# Patient Record
Sex: MALE | NOT HISPANIC OR LATINO | URBAN - METROPOLITAN AREA
[De-identification: names, ages, dates, MRNs, and addresses within clinical notes are randomized per-mention and may not be internally consistent; named-entity substitution may affect disease eponyms.]

---

## 2021-01-28 ENCOUNTER — IMMUNIZATION (OUTPATIENT)
Dept: VACCINE CLINIC | Facility: HOSPITAL | Age: 66
End: 2021-01-28

## 2021-01-28 PROCEDURE — 91300 HC SARSCOV02 VAC 30MCG/0.3ML IM: CPT | Performed by: INTERNAL MEDICINE

## 2021-01-28 PROCEDURE — 0001A: CPT | Performed by: INTERNAL MEDICINE

## 2021-02-18 ENCOUNTER — IMMUNIZATION (OUTPATIENT)
Dept: VACCINE CLINIC | Facility: HOSPITAL | Age: 66
End: 2021-02-18

## 2021-02-18 PROCEDURE — 0002A: CPT | Performed by: INTERNAL MEDICINE

## 2021-02-18 PROCEDURE — 91300 HC SARSCOV02 VAC 30MCG/0.3ML IM: CPT | Performed by: INTERNAL MEDICINE

## 2023-12-11 ENCOUNTER — HOSPITAL ENCOUNTER (OUTPATIENT)
Dept: GENERAL RADIOLOGY | Facility: HOSPITAL | Age: 68
Discharge: HOME | End: 2023-12-11
Admitting: CLINIC/CENTER
Payer: MEDICARE

## 2023-12-11 ENCOUNTER — TRANSCRIBE ORDERS (OUTPATIENT)
Dept: GENERAL RADIOLOGY | Facility: HOSPITAL | Age: 68
End: 2023-12-11
Payer: MEDICARE

## 2023-12-11 DIAGNOSIS — M25.552 LEFT HIP PAIN: ICD-10-CM

## 2023-12-11 DIAGNOSIS — M25.552 LEFT HIP PAIN: Primary | ICD-10-CM

## 2023-12-11 PROCEDURE — 73502 X-RAY EXAM HIP UNI 2-3 VIEWS: CPT

## 2025-02-20 RX ORDER — AMLODIPINE BESYLATE 10 MG/1
TABLET ORAL
Qty: 90 TABLET | Refills: 3 | Status: SHIPPED | OUTPATIENT
Start: 2025-02-20

## 2025-02-27 ENCOUNTER — TELEPHONE (OUTPATIENT)
Age: 70
End: 2025-02-27
Payer: MEDICARE

## 2025-02-27 DIAGNOSIS — E78.2 MIXED HYPERLIPIDEMIA: ICD-10-CM

## 2025-02-27 DIAGNOSIS — I10 ESSENTIAL HYPERTENSION: Primary | ICD-10-CM

## 2025-02-28 ENCOUNTER — LAB (OUTPATIENT)
Facility: HOSPITAL | Age: 70
End: 2025-02-28
Payer: MEDICARE

## 2025-02-28 DIAGNOSIS — I10 ESSENTIAL HYPERTENSION: ICD-10-CM

## 2025-02-28 DIAGNOSIS — E78.2 MIXED HYPERLIPIDEMIA: ICD-10-CM

## 2025-02-28 LAB
ALBUMIN SERPL-MCNC: 4 G/DL (ref 3.5–5.2)
ALBUMIN/GLOB SERPL: 1.4 G/DL
ALP SERPL-CCNC: 90 U/L (ref 39–117)
ALT SERPL W P-5'-P-CCNC: 20 U/L (ref 1–41)
ANION GAP SERPL CALCULATED.3IONS-SCNC: 12 MMOL/L (ref 5–15)
AST SERPL-CCNC: 19 U/L (ref 1–40)
BILIRUB SERPL-MCNC: 0.2 MG/DL (ref 0–1.2)
BUN SERPL-MCNC: 11 MG/DL (ref 8–23)
BUN/CREAT SERPL: 12 (ref 7–25)
CALCIUM SPEC-SCNC: 8.9 MG/DL (ref 8.6–10.5)
CHLORIDE SERPL-SCNC: 105 MMOL/L (ref 98–107)
CHOLEST SERPL-MCNC: 111 MG/DL (ref 0–200)
CO2 SERPL-SCNC: 25 MMOL/L (ref 22–29)
CREAT SERPL-MCNC: 0.92 MG/DL (ref 0.76–1.27)
EGFRCR SERPLBLD CKD-EPI 2021: 90 ML/MIN/1.73
GLOBULIN UR ELPH-MCNC: 2.8 GM/DL
GLUCOSE SERPL-MCNC: 121 MG/DL (ref 65–99)
HDLC SERPL-MCNC: 34 MG/DL (ref 40–60)
LDLC SERPL CALC-MCNC: 45 MG/DL (ref 0–100)
LDLC/HDLC SERPL: 1.09 {RATIO}
POTASSIUM SERPL-SCNC: 3.5 MMOL/L (ref 3.5–5.2)
PROT SERPL-MCNC: 6.8 G/DL (ref 6–8.5)
SODIUM SERPL-SCNC: 142 MMOL/L (ref 136–145)
TRIGL SERPL-MCNC: 200 MG/DL (ref 0–150)
VLDLC SERPL-MCNC: 32 MG/DL (ref 5–40)

## 2025-02-28 PROCEDURE — 36415 COLL VENOUS BLD VENIPUNCTURE: CPT

## 2025-02-28 PROCEDURE — 80053 COMPREHEN METABOLIC PANEL: CPT

## 2025-02-28 PROCEDURE — 80061 LIPID PANEL: CPT

## 2025-03-05 PROBLEM — I10 HYPERTENSION, ESSENTIAL: Status: ACTIVE | Noted: 2025-03-05

## 2025-03-05 PROBLEM — I25.10 CORONARY ARTERY DISEASE INVOLVING NATIVE CORONARY ARTERY OF NATIVE HEART: Status: ACTIVE | Noted: 2025-03-05

## 2025-03-05 PROBLEM — E78.5 HYPERLIPIDEMIA LDL GOAL <55: Status: ACTIVE | Noted: 2025-03-05

## 2025-03-05 NOTE — PROGRESS NOTES
"    Cardiology Follow-Up Note     Name: Keenan Posey  :   1955  PCP: Jeff Wheat MD  Date:   2025  Department: [facility] CARDIOLOGY  [address]  [facility] [address]  Fax [phone]  Phone [phone]    Chief Complaint   Patient presents with    Coronary Artery Disease    Hypertension    Hyperlipidemia     Subjective     History of Present Illness  Keenan Posey is a 70 y.o. male who presents today for 6-month follow-up.  Patient with past medical history of STEMI, CAD s/p stenting, hypertension, hyperlipidemia.      CT chest 2024: Mild ectasia of aorta measuring 35 mm  Cath 11: Stent to CX, patent stent RCA  Cath 2011: STEMI, stent RCA  Echo 2023: EF 69%, mild LVH, trace MR, trace TR, aortic root 4.3 cm      Current Outpatient Medications:     amLODIPine (NORVASC) 10 MG tablet, TAKE 1 TABLET ONE TIME DAILY, Disp: 90 tablet, Rfl: 3    aspirin 81 MG EC tablet, Take 1 tablet by mouth Daily., Disp: , Rfl:     atorvastatin (LIPITOR) 20 MG tablet, Take 1 tablet by mouth Daily., Disp: , Rfl:     carvedilol (COREG) 25 MG tablet, Take 1 tablet by mouth 2 (Two) Times a Day With Meals., Disp: , Rfl:     colchicine (Colcrys) 0.6 MG tablet, Take 1 tablet by mouth Daily., Disp: , Rfl:     lisinopril (PRINIVIL,ZESTRIL) 40 MG tablet, Take 1 tablet by mouth Daily., Disp: , Rfl:     Objective     Vital Signs:  /65 (BP Location: Right arm, Patient Position: Sitting)   Pulse 79   Ht 175.3 cm (69\")   Wt 100 kg (221 lb)   BMI 32.64 kg/m²   Estimated body mass index is 32.64 kg/m² as calculated from the following:    Height as of this encounter: 175.3 cm (69\").    Weight as of this encounter: 100 kg (221 lb).             Vitals reviewed.   Constitutional:       Appearance: Normal and healthy appearance.   Eyes:      Pupils: Pupils are equal, round, and reactive to light.   Pulmonary:      Effort: Pulmonary effort is normal. " "  Chest:      Chest wall: Not tender to palpatation.   Cardiovascular:      PMI at left midclavicular line. Normal rate. Regular rhythm.      No gallop.    Pulses:     Intact distal pulses.   Edema:     Peripheral edema absent.   Skin:     General: Skin is warm.   Psychiatric:         Behavior: Behavior is cooperative.              Data Review:   Lab Results   Component Value Date    GLUCOSE 121 (H) 02/28/2025    BUN 11 02/28/2025    CREATININE 0.92 02/28/2025    BCR 12.0 02/28/2025    K 3.5 02/28/2025    CO2 25.0 02/28/2025    CALCIUM 8.9 02/28/2025    ALBUMIN 4.0 02/28/2025    AST 19 02/28/2025    ALT 20 02/28/2025     Lab Results   Component Value Date    CHOL 111 02/28/2025    TRIG 200 (H) 02/28/2025    HDL 34 (L) 02/28/2025    LDL 45 02/28/2025      No results found for: \"WBC\", \"RBC\", \"HGB\", \"HCT\", \"MCV\", \"PLT\"  No results found for: \"TSH\"  No results found for: \"HGBA1C\"  No results found for: \"INR\", \"PROTIME\"        Assessment and Plan     Assessment & Plan  Coronary artery disease involving native coronary artery of native heart without angina pectoris  Continue aspirin 81 mg once a day, notify if develop angina.    Orders:    Lipid Panel; Future    CBC & Differential; Future    Basic Metabolic Panel; Future    Hypertension, essential  Continue lisinopril 40 mg once a day, Coreg 25 mg twice a day and amlodipine 10 mg once a day.  Keep blood pressure ideally less than 120/80 on the average less than 130/80.    Orders:    Lipid Panel; Future    CBC & Differential; Future    Basic Metabolic Panel; Future    Mixed hyperlipidemia  Continue Lipitor 20 mg once a day.  Goal is to keep LDL less than 55.  Will start Vascepa 2 g p.o. twice daily.  Notify if develop any arrhythmias, discussed with patient side effects.    Orders:    Lipid Panel; Future    CBC & Differential; Future    Basic Metabolic Panel; Future      Advised to continue current cardiac medications. Please notify of any issues. Discussed with the " patient compliance with medical management and follow-up.     Follow Up  Return in about 3 months (around 6/6/2025).    Call if you have any significant symptoms or go to the [facility] if possible.     Stevenson Griffith MD, FACC,[facility].  [facility]    Part of this note may be an electronic transcription/translation of spoken language to printed text using the Dragon Dictation System.

## 2025-03-06 ENCOUNTER — OFFICE VISIT (OUTPATIENT)
Age: 70
End: 2025-03-06
Payer: MEDICARE

## 2025-03-06 VITALS
WEIGHT: 221 LBS | SYSTOLIC BLOOD PRESSURE: 125 MMHG | DIASTOLIC BLOOD PRESSURE: 65 MMHG | BODY MASS INDEX: 32.73 KG/M2 | HEIGHT: 69 IN | HEART RATE: 79 BPM

## 2025-03-06 DIAGNOSIS — I25.10 CORONARY ARTERY DISEASE INVOLVING NATIVE CORONARY ARTERY OF NATIVE HEART WITHOUT ANGINA PECTORIS: Primary | ICD-10-CM

## 2025-03-06 DIAGNOSIS — E78.2 MIXED HYPERLIPIDEMIA: ICD-10-CM

## 2025-03-06 DIAGNOSIS — I10 HYPERTENSION, ESSENTIAL: ICD-10-CM

## 2025-03-06 RX ORDER — COLCHICINE 0.6 MG/1
0.6 TABLET ORAL DAILY
COMMUNITY

## 2025-03-06 RX ORDER — ASPIRIN 81 MG/1
1 TABLET ORAL DAILY
COMMUNITY

## 2025-03-06 RX ORDER — LISINOPRIL 40 MG/1
40 TABLET ORAL DAILY
COMMUNITY
Start: 2025-02-03

## 2025-03-06 RX ORDER — ICOSAPENT ETHYL 1 G/1
2 CAPSULE ORAL 2 TIMES DAILY WITH MEALS
Qty: 120 CAPSULE | Refills: 3 | Status: SHIPPED | OUTPATIENT
Start: 2025-03-06

## 2025-03-06 RX ORDER — ATORVASTATIN CALCIUM 20 MG/1
20 TABLET, FILM COATED ORAL DAILY
COMMUNITY

## 2025-03-06 RX ORDER — CARVEDILOL 25 MG/1
25 TABLET ORAL 2 TIMES DAILY WITH MEALS
COMMUNITY
Start: 2024-12-29

## 2025-03-06 NOTE — ASSESSMENT & PLAN NOTE
Continue Lipitor 20 mg once a day.  Goal is to keep LDL less than 55.  Will start Vascepa 2 g p.o. twice daily.  Notify if develop any arrhythmias, discussed with patient side effects.    Orders:    Lipid Panel; Future    CBC & Differential; Future    Basic Metabolic Panel; Future

## 2025-03-06 NOTE — ASSESSMENT & PLAN NOTE
Continue aspirin 81 mg once a day, notify if develop angina.    Orders:    Lipid Panel; Future    CBC & Differential; Future    Basic Metabolic Panel; Future

## 2025-03-06 NOTE — ASSESSMENT & PLAN NOTE
Continue lisinopril 40 mg once a day, Coreg 25 mg twice a day and amlodipine 10 mg once a day.  Keep blood pressure ideally less than 120/80 on the average less than 130/80.    Orders:    Lipid Panel; Future    CBC & Differential; Future    Basic Metabolic Panel; Future

## 2025-03-12 RX ORDER — CARVEDILOL 25 MG/1
25 TABLET ORAL 2 TIMES DAILY
Qty: 180 TABLET | Refills: 1 | Status: SHIPPED | OUTPATIENT
Start: 2025-03-12

## 2025-03-12 NOTE — TELEPHONE ENCOUNTER
Rx Refill Note  Requested Prescriptions     Pending Prescriptions Disp Refills    carvedilol (COREG) 25 MG tablet [Pharmacy Med Name: Carvedilol Oral Tablet 25 MG] 180 tablet 3     Sig: TAKE 1 TABLET TWICE DAILY      Last office visit with prescribing clinician: 3/6/2025   Last telemedicine visit with prescribing clinician: Visit date not found   Next office visit with prescribing clinician: 6/6/2025                        Would you like a call back once the refill request has been completed: [] Yes [x] No [] N/A    If the office needs to give you a call back, can they leave a voicemail: [] Yes [x] No [] N/A    Pharmacy Info    Last Fill Date: NA  Rx Written Date: NA  Prescribed Qty: 180  Additional Details from Pharmacy: JULIANN Carlson MA  03/12/25, 07:59 EDT

## 2025-04-17 RX ORDER — LISINOPRIL 40 MG/1
TABLET ORAL
Qty: 90 TABLET | Refills: 3 | Status: SHIPPED | OUTPATIENT
Start: 2025-04-17

## 2025-04-17 RX ORDER — ATORVASTATIN CALCIUM 20 MG/1
20 TABLET, FILM COATED ORAL DAILY
Qty: 90 TABLET | Refills: 3 | Status: SHIPPED | OUTPATIENT
Start: 2025-04-17

## 2025-04-17 NOTE — TELEPHONE ENCOUNTER
Rx Refill Note  Requested Prescriptions     Pending Prescriptions Disp Refills    atorvastatin (LIPITOR) 20 MG tablet [Pharmacy Med Name: Atorvastatin Calcium Oral Tablet 20 MG] 90 tablet 3     Sig: TAKE 1 TABLET EVERY DAY    lisinopril (PRINIVIL,ZESTRIL) 40 MG tablet [Pharmacy Med Name: Lisinopril Oral Tablet 40 MG] 90 tablet 3     Sig: TAKE 1 TABLET ONE TIME DAILY      Last office visit with prescribing clinician: 3/6/2025   Last telemedicine visit with prescribing clinician: Visit date not found   Next office visit with prescribing clinician: 6/6/2025                        Pharmacy Info    Last Fill Date:  Rx Written Date:   Prescribed Qty:   Additional Details from Pharmacy:    Patient passed protocols per yong.         Paige Carlson MA  04/17/25, 08:44 EDT

## 2025-05-29 ENCOUNTER — LAB (OUTPATIENT)
Facility: HOSPITAL | Age: 70
End: 2025-05-29
Payer: MEDICARE

## 2025-05-29 DIAGNOSIS — I25.10 CORONARY ARTERY DISEASE INVOLVING NATIVE CORONARY ARTERY OF NATIVE HEART WITHOUT ANGINA PECTORIS: ICD-10-CM

## 2025-05-29 DIAGNOSIS — I10 ESSENTIAL HYPERTENSION: ICD-10-CM

## 2025-05-29 DIAGNOSIS — E78.2 MIXED HYPERLIPIDEMIA: ICD-10-CM

## 2025-05-29 DIAGNOSIS — I10 HYPERTENSION, ESSENTIAL: ICD-10-CM

## 2025-05-29 LAB
ANION GAP SERPL CALCULATED.3IONS-SCNC: 11.1 MMOL/L (ref 5–15)
BASOPHILS # BLD AUTO: 0.07 10*3/MM3 (ref 0–0.2)
BASOPHILS NFR BLD AUTO: 0.6 % (ref 0–1.5)
BUN SERPL-MCNC: 10 MG/DL (ref 8–23)
BUN/CREAT SERPL: 12.2 (ref 7–25)
CALCIUM SPEC-SCNC: 9.3 MG/DL (ref 8.6–10.5)
CHLORIDE SERPL-SCNC: 103 MMOL/L (ref 98–107)
CHOLEST SERPL-MCNC: 98 MG/DL (ref 0–200)
CO2 SERPL-SCNC: 24.9 MMOL/L (ref 22–29)
CREAT SERPL-MCNC: 0.82 MG/DL (ref 0.76–1.27)
DEPRECATED RDW RBC AUTO: 48.1 FL (ref 37–54)
EGFRCR SERPLBLD CKD-EPI 2021: 95.1 ML/MIN/1.73
EOSINOPHIL # BLD AUTO: 0.31 10*3/MM3 (ref 0–0.4)
EOSINOPHIL NFR BLD AUTO: 2.8 % (ref 0.3–6.2)
ERYTHROCYTE [DISTWIDTH] IN BLOOD BY AUTOMATED COUNT: 15.2 % (ref 12.3–15.4)
GLUCOSE SERPL-MCNC: 131 MG/DL (ref 65–99)
HCT VFR BLD AUTO: 39.2 % (ref 37.5–51)
HDLC SERPL-MCNC: 30 MG/DL (ref 40–60)
HGB BLD-MCNC: 12.1 G/DL (ref 13–17.7)
IMM GRANULOCYTES # BLD AUTO: 0.04 10*3/MM3 (ref 0–0.05)
IMM GRANULOCYTES NFR BLD AUTO: 0.4 % (ref 0–0.5)
LDLC SERPL CALC-MCNC: 44 MG/DL (ref 0–100)
LDLC/HDLC SERPL: 1.34 {RATIO}
LYMPHOCYTES # BLD AUTO: 2.45 10*3/MM3 (ref 0.7–3.1)
LYMPHOCYTES NFR BLD AUTO: 22.3 % (ref 19.6–45.3)
MCH RBC QN AUTO: 26.7 PG (ref 26.6–33)
MCHC RBC AUTO-ENTMCNC: 30.9 G/DL (ref 31.5–35.7)
MCV RBC AUTO: 86.3 FL (ref 79–97)
MONOCYTES # BLD AUTO: 1.04 10*3/MM3 (ref 0.1–0.9)
MONOCYTES NFR BLD AUTO: 9.5 % (ref 5–12)
NEUTROPHILS NFR BLD AUTO: 64.4 % (ref 42.7–76)
NEUTROPHILS NFR BLD AUTO: 7.09 10*3/MM3 (ref 1.7–7)
NRBC BLD AUTO-RTO: 0 /100 WBC (ref 0–0.2)
PLATELET # BLD AUTO: 345 10*3/MM3 (ref 140–450)
PMV BLD AUTO: 9.8 FL (ref 6–12)
POTASSIUM SERPL-SCNC: 3.8 MMOL/L (ref 3.5–5.2)
RBC # BLD AUTO: 4.54 10*6/MM3 (ref 4.14–5.8)
SODIUM SERPL-SCNC: 139 MMOL/L (ref 136–145)
TRIGL SERPL-MCNC: 139 MG/DL (ref 0–150)
VLDLC SERPL-MCNC: 24 MG/DL (ref 5–40)
WBC NRBC COR # BLD AUTO: 11 10*3/MM3 (ref 3.4–10.8)

## 2025-05-29 PROCEDURE — 80061 LIPID PANEL: CPT

## 2025-05-29 PROCEDURE — 36415 COLL VENOUS BLD VENIPUNCTURE: CPT

## 2025-05-29 PROCEDURE — 80048 BASIC METABOLIC PNL TOTAL CA: CPT

## 2025-05-29 PROCEDURE — 85025 COMPLETE CBC W/AUTO DIFF WBC: CPT

## 2025-06-04 PROBLEM — I77.810 DILATED AORTIC ROOT: Status: ACTIVE | Noted: 2025-06-04

## 2025-06-04 PROBLEM — E78.5 HYPERLIPIDEMIA: Status: ACTIVE | Noted: 2025-06-04

## 2025-06-04 PROBLEM — I10 PRIMARY HYPERTENSION: Status: ACTIVE | Noted: 2025-06-04

## 2025-06-04 NOTE — ASSESSMENT & PLAN NOTE
Lipid abnormalities are stable    Plan:  Continue same medication/s without change.      Discussed medication dosage, use, side effects, and goals of treatment in detail.    Counseled patient on lifestyle modifications to help control hyperlipidemia.   Advised patient to exercise for 150 minutes weekly. (30 minute brisk walk, 5 days a week for example)    Patient Treatment Goals:   LDL goal is less than 55    Followup in 3 months.  Continue Lipitor 20 mg once a day and Vascepa 2 g twice a day.  To lower mixed hyperlipidemia both the LDL and triglycerides.

## 2025-06-04 NOTE — ASSESSMENT & PLAN NOTE
Hypertension is stable and controlled  Continue current treatment regimen.  Dietary sodium restriction.  Weight loss.  Ambulatory blood pressure monitoring.  Blood pressure will be reassessed in 3 months.  Continue lisinopril 40 mg once a day and amlodipine 10 mg once a day.  Goals discussed.    Discussed with patient American College of cardiology and American Heart Association provide detailed guidelines for accurate blood pressure measurement.  Key steps for proper blood pressure measurement include:    Recommend being fully relaxed sitting in chair with feet on the floor and back supported for at least 5 minutes.  Avoid caffeine, exercise and smoking for at least 30 minutes before management.  Ensure empty bladder, remove clothing covering the location of the cuff placement using proper blood pressure equipment and the blood pressure cuff size should be corrected with bladder encircling 80% of the arm.  Repeat blood pressure if blood pressure is extremely high.  The ideal blood pressure is less than 120/80 on the average blood pressure should be less than 130/80.

## 2025-06-04 NOTE — ASSESSMENT & PLAN NOTE
Coronary Artery Disease (OPTIONAL): Coronary artery disease is stable.  Continue current treatment regimen. Dietary sodium restriction. Weight loss.  Cardiac status will be reassessed in 3 months.  Continue aspirin 81 mg once a day to lower the risk of myocardial infarction.

## 2025-06-04 NOTE — PROGRESS NOTES
Cardiology Follow-Up Note     Name: Keenan Posey  :   1955  PCP: Jeff Wheat MD  Date:   2025  Department: [facility] CARDIOLOGY  [address]  [facility] [address]  Fax [phone]  Phone [phone]    Chief Complaint   Patient presents with    Hyperlipidemia    Hypertension     Problem list:  1.  Coronary artery disease native vessel   [facility] 2011 acute STEMI, stent to RCA   [facility] 2011 stent to circumflex, patent stent to RCA  2.  Hypertension benign essential   2D echo 2023 EF 69%, trace MR, TR, HI.  Aortic root dilated at 4.3 cm, mild increase in LV wall thickness  3.  Hyperlipidemia  4.  Aortic root dilation      Subjective     History of Present Illness  Keenan Posey is a 70 y.o. male who presents today for routine 3-month follow-up.Doing well,limited due to bad back, no chest pain or shortness of breath at rest gets dyspnea on exertion.     Past Medical History:   Diagnosis Date    Aortic root dilation     Arthritis     Coronary atherosclerosis of native coronary vessel     Gout     Hypercholesterolemia     Hypertension, benign essential, age 0-18     Mitral regurgitation     Myocardial infarction     Obesity     Tricuspid regurgitation       Past Surgical History:   Procedure Laterality Date    ANGIOPLASTY      APPENDECTOMY  1985    CARDIAC CATHETERIZATION  2011    BY    LHC/STENT TO CX/PATENT STENT TO RCA    CARDIAC CATHETERIZATION  2011    SJE  EF 50%. Acute ST elevation inferior wall myocardial infarction.  Successful Xience stenting of the RCA.    CORONARY STENT PLACEMENT         Current Outpatient Medications:     amLODIPine (NORVASC) 10 MG tablet, TAKE 1 TABLET ONE TIME DAILY, Disp: 90 tablet, Rfl: 3    aspirin 81 MG EC tablet, Take 1 tablet by mouth Daily., Disp: , Rfl:     atorvastatin (LIPITOR) 20 MG tablet, TAKE 1 TABLET EVERY DAY, Disp: 90 tablet, Rfl: 3    carvedilol (COREG) 25 MG  "tablet, TAKE 1 TABLET TWICE DAILY, Disp: 180 tablet, Rfl: 1    lisinopril (PRINIVIL,ZESTRIL) 40 MG tablet, TAKE 1 TABLET ONE TIME DAILY, Disp: 90 tablet, Rfl: 3    Objective     Vital Signs:  /68 (BP Location: Right arm, Patient Position: Sitting, Cuff Size: Adult)   Pulse 71   Ht 175.3 cm (69\")   Wt 98.9 kg (218 lb)   BMI 32.19 kg/m²   Estimated body mass index is 32.19 kg/m² as calculated from the following:    Height as of this encounter: 175.3 cm (69\").    Weight as of this encounter: 98.9 kg (218 lb).       BMI is >= 30 and <35. (Class 1 Obesity). The following options were offered after discussion;: exercise counseling/recommendations      Cardiovascular:      PMI at left midclavicular line. Normal rate. Regular rhythm. Normal S1. Normal S2.       Murmurs: There is a systolic murmur.      No gallop.  No click. No rub.   Pulses:     Intact distal pulses.   Edema:     Peripheral edema absent.              Data Review:   Lab Results   Component Value Date    GLUCOSE 131 (H) 05/29/2025    BUN 10.0 05/29/2025    CREATININE 0.82 05/29/2025    BCR 12.2 05/29/2025    K 3.8 05/29/2025    CO2 24.9 05/29/2025    CALCIUM 9.3 05/29/2025    ALBUMIN 4.0 02/28/2025    AST 19 02/28/2025    ALT 20 02/28/2025     Lab Results   Component Value Date    CHOL 98 05/29/2025    TRIG 139 05/29/2025    HDL 30 (L) 05/29/2025    LDL 44 05/29/2025      Lab Results   Component Value Date    WBC 11.00 (H) 05/29/2025    RBC 4.54 05/29/2025    HGB 12.1 (L) 05/29/2025    HCT 39.2 05/29/2025    MCV 86.3 05/29/2025     05/29/2025     No results found for: \"TSH\"  No results found for: \"HGBA1C\"  No results found for: \"INR\", \"PROTIME\"        Assessment and Plan     Assessment & Plan  Coronary artery disease involving native coronary artery of native heart, unspecified whether angina present  Coronary Artery Disease (OPTIONAL): Coronary artery disease is stable.  Continue current treatment regimen. Dietary sodium restriction. Weight " loss.  Cardiac status will be reassessed in 3 months.  Continue aspirin 81 mg once a day to lower the risk of myocardial infarction.       Primary hypertension  Hypertension is stable and controlled  Continue current treatment regimen.  Dietary sodium restriction.  Weight loss.  Ambulatory blood pressure monitoring.  Blood pressure will be reassessed in 3 months.  Continue lisinopril 40 mg once a day and amlodipine 10 mg once a day.  Goals discussed.    Discussed with patient American College of cardiology and American Heart Association provide detailed guidelines for accurate blood pressure measurement.  Key steps for proper blood pressure measurement include:    Recommend being fully relaxed sitting in chair with feet on the floor and back supported for at least 5 minutes.  Avoid caffeine, exercise and smoking for at least 30 minutes before management.  Ensure empty bladder, remove clothing covering the location of the cuff placement using proper blood pressure equipment and the blood pressure cuff size should be corrected with bladder encircling 80% of the arm.  Repeat blood pressure if blood pressure is extremely high.  The ideal blood pressure is less than 120/80 on the average blood pressure should be less than 130/80.             Mixed hyperlipidemia   Lipid abnormalities are stable    Plan:  Continue same medication/s without change.      Discussed medication dosage, use, side effects, and goals of treatment in detail.    Counseled patient on lifestyle modifications to help control hyperlipidemia.   Advised patient to exercise for 150 minutes weekly. (30 minute brisk walk, 5 days a week for example)    Patient Treatment Goals:   LDL goal is less than 55    Followup in 3 months.  Continue Lipitor 20 mg once a day and Vascepa 2 g twice a day.  To lower mixed hyperlipidemia both the LDL and triglycerides.       Dilated aortic root  Will repeat echocardiogram for evaluation of aortic root and aortic  regurgitation.         Advised to continue current cardiac medications. Please notify of any issues. Discussed with the patient compliance with medical management and follow-up.     Follow Up  No follow-ups on file.    Call if you have any significant symptoms or go to the [facility] room if possible.     Stevenson Griffith MD, FACC,[facility].  [facility]    Part of this note may be an electronic transcription/translation of spoken language to printed text using the Dragon Dictation System.

## 2025-06-06 ENCOUNTER — PATIENT ROUNDING (BHMG ONLY) (OUTPATIENT)
Age: 70
End: 2025-06-06

## 2025-06-06 ENCOUNTER — OFFICE VISIT (OUTPATIENT)
Age: 70
End: 2025-06-06
Payer: MEDICARE

## 2025-06-06 VITALS
HEART RATE: 71 BPM | HEIGHT: 69 IN | BODY MASS INDEX: 32.29 KG/M2 | WEIGHT: 218 LBS | DIASTOLIC BLOOD PRESSURE: 68 MMHG | SYSTOLIC BLOOD PRESSURE: 151 MMHG

## 2025-06-06 DIAGNOSIS — I25.10 CORONARY ARTERY DISEASE INVOLVING NATIVE CORONARY ARTERY OF NATIVE HEART, UNSPECIFIED WHETHER ANGINA PRESENT: Primary | ICD-10-CM

## 2025-06-06 DIAGNOSIS — I77.810 DILATED AORTIC ROOT: ICD-10-CM

## 2025-06-06 DIAGNOSIS — I10 PRIMARY HYPERTENSION: ICD-10-CM

## 2025-06-06 DIAGNOSIS — E78.2 MIXED HYPERLIPIDEMIA: ICD-10-CM

## 2025-06-06 NOTE — PROGRESS NOTES
My name is Lisset Otero, and I am the Practice Manager for [facility] [place].    I would like to thank you for being a loyal patient. If you do not mind, I would like to ask you some questions about your recent visit with us. Please feel free to reply if you wish to provide us with feedback on your visit with our practice.    First, could you tell me what went well with your recent visit?    Secondly, we are always looking for ways to make our patients' experiences even better. Do you have any recommendations on what we can do to improve your experience?    Finally, overall were you satisfied with your visit with us as a [facility] facility?    Over the next few days, you will be receiving a Patient Experience Survey. Please consider taking the survey, as it helps [facility] in improving their patient care.    Thank you for taking the time to answer our questions today.    I hope you have a good day.

## 2025-08-06 RX ORDER — CARVEDILOL 25 MG/1
25 TABLET ORAL 2 TIMES DAILY
Qty: 180 TABLET | Refills: 1 | Status: SHIPPED | OUTPATIENT
Start: 2025-08-06

## 2025-08-14 ENCOUNTER — HOSPITAL ENCOUNTER (OUTPATIENT)
Dept: CARDIOLOGY | Facility: HOSPITAL | Age: 70
Discharge: HOME | End: 2025-08-14
Admitting: INTERNAL MEDICINE
Payer: MEDICARE

## 2025-08-14 DIAGNOSIS — I10 PRIMARY HYPERTENSION: ICD-10-CM

## 2025-08-14 DIAGNOSIS — E78.2 MIXED HYPERLIPIDEMIA: ICD-10-CM

## 2025-08-14 DIAGNOSIS — I25.10 CORONARY ARTERY DISEASE INVOLVING NATIVE CORONARY ARTERY OF NATIVE HEART, UNSPECIFIED WHETHER ANGINA PRESENT: ICD-10-CM

## 2025-08-14 DIAGNOSIS — I77.810 DILATED AORTIC ROOT: ICD-10-CM

## 2025-08-14 LAB
BH CV ECHO LEFT VENTRICLE GLOBAL LONGITUDINAL STRAIN: -17.5 %
BH CV ECHO MEAS - AO ROOT DIAM: 4.2 CM
BH CV ECHO MEAS - EDV(CUBED): 157.5 ML
BH CV ECHO MEAS - EDV(MOD-SP2): 188 ML
BH CV ECHO MEAS - EDV(MOD-SP4): 189 ML
BH CV ECHO MEAS - EF(MOD-SP2): 44.1 %
BH CV ECHO MEAS - EF(MOD-SP4): 57.2 %
BH CV ECHO MEAS - ESV(CUBED): 50.7 ML
BH CV ECHO MEAS - ESV(MOD-SP2): 105 ML
BH CV ECHO MEAS - ESV(MOD-SP4): 80.8 ML
BH CV ECHO MEAS - FS: 31.5 %
BH CV ECHO MEAS - IVS/LVPW: 1 CM
BH CV ECHO MEAS - IVSD: 1.6 CM
BH CV ECHO MEAS - LA DIMENSION: 4 CM
BH CV ECHO MEAS - LAT PEAK E' VEL: 8.1 CM/SEC
BH CV ECHO MEAS - LV DIASTOLIC VOL/BSA (35-75): 88.3 CM2
BH CV ECHO MEAS - LV MASS(C)D: 398.8 GRAMS
BH CV ECHO MEAS - LV MAX PG: 6.4 MMHG
BH CV ECHO MEAS - LV MEAN PG: 3 MMHG
BH CV ECHO MEAS - LV SYSTOLIC VOL/BSA (12-30): 37.7 CM2
BH CV ECHO MEAS - LV V1 MAX: 126 CM/SEC
BH CV ECHO MEAS - LV V1 VTI: 27.2 CM
BH CV ECHO MEAS - LVIDD: 5.4 CM
BH CV ECHO MEAS - LVIDS: 3.7 CM
BH CV ECHO MEAS - LVOT AREA: 3.1 CM2
BH CV ECHO MEAS - LVOT DIAM: 2 CM
BH CV ECHO MEAS - LVPWD: 1.6 CM
BH CV ECHO MEAS - MED PEAK E' VEL: 7.1 CM/SEC
BH CV ECHO MEAS - MV A MAX VEL: 106 CM/SEC
BH CV ECHO MEAS - MV DEC SLOPE: 325.5 CM/SEC2
BH CV ECHO MEAS - MV DEC TIME: 0.27 SEC
BH CV ECHO MEAS - MV E MAX VEL: 87.5 CM/SEC
BH CV ECHO MEAS - MV E/A: 0.83
BH CV ECHO MEAS - MV MAX PG: 5.8 MMHG
BH CV ECHO MEAS - MV MEAN PG: 2 MMHG
BH CV ECHO MEAS - MV P1/2T: 93.6 MSEC
BH CV ECHO MEAS - MV V2 VTI: 39.5 CM
BH CV ECHO MEAS - MVA(P1/2T): 2.35 CM2
BH CV ECHO MEAS - MVA(VTI): 2.16 CM2
BH CV ECHO MEAS - PA ACC TIME: 0.1 SEC
BH CV ECHO MEAS - SV(LVOT): 85.5 ML
BH CV ECHO MEAS - SV(MOD-SP2): 83 ML
BH CV ECHO MEAS - SV(MOD-SP4): 108.2 ML
BH CV ECHO MEAS - SVI(LVOT): 39.9 ML/M2
BH CV ECHO MEAS - SVI(MOD-SP2): 38.8 ML/M2
BH CV ECHO MEAS - SVI(MOD-SP4): 50.6 ML/M2
BH CV ECHO MEAS - TAPSE (>1.6): 1.88 CM
BH CV ECHO MEASUREMENTS AVERAGE E/E' RATIO: 11.51
BH CV XLRA - RV BASE: 3.9 CM
BH CV XLRA - RV LENGTH: 5.4 CM
BH CV XLRA - RV MID: 2.7 CM
BH CV XLRA - TDI S': 11.5 CM/SEC
IVRT: 102 MS
LEFT ATRIUM VOLUME INDEX: 34.2 ML/M2
LV EF BIPLANE MOD: 51.7 %

## 2025-08-14 PROCEDURE — 93356 MYOCRD STRAIN IMG SPCKL TRCK: CPT

## 2025-08-14 PROCEDURE — 93306 TTE W/DOPPLER COMPLETE: CPT
